# Patient Record
Sex: MALE | Race: OTHER | NOT HISPANIC OR LATINO
[De-identification: names, ages, dates, MRNs, and addresses within clinical notes are randomized per-mention and may not be internally consistent; named-entity substitution may affect disease eponyms.]

---

## 2020-04-22 PROBLEM — Z00.00 ENCOUNTER FOR PREVENTIVE HEALTH EXAMINATION: Status: ACTIVE | Noted: 2020-04-22

## 2021-11-30 ENCOUNTER — TRANSCRIPTION ENCOUNTER (OUTPATIENT)
Age: 51
End: 2021-11-30

## 2022-01-03 ENCOUNTER — APPOINTMENT (OUTPATIENT)
Dept: NEUROLOGY | Facility: CLINIC | Age: 52
End: 2022-01-03

## 2022-03-03 ENCOUNTER — APPOINTMENT (OUTPATIENT)
Dept: NEUROLOGY | Facility: CLINIC | Age: 52
End: 2022-03-03
Payer: COMMERCIAL

## 2022-03-03 VITALS
SYSTOLIC BLOOD PRESSURE: 133 MMHG | WEIGHT: 198 LBS | HEIGHT: 67 IN | BODY MASS INDEX: 31.08 KG/M2 | DIASTOLIC BLOOD PRESSURE: 90 MMHG | HEART RATE: 94 BPM

## 2022-03-03 DIAGNOSIS — G62.9 POLYNEUROPATHY, UNSPECIFIED: ICD-10-CM

## 2022-03-03 DIAGNOSIS — Z83.3 FAMILY HISTORY OF DIABETES MELLITUS: ICD-10-CM

## 2022-03-03 PROCEDURE — 99243 OFF/OP CNSLTJ NEW/EST LOW 30: CPT

## 2022-03-03 NOTE — HISTORY OF PRESENT ILLNESS
[FreeTextEntry1] : Mr. Jacobs is a 51-year-old   male MI current from [facility] was referred for neurological consultation by Dr. Waters.\par \par The patient is stated that he has developed burning paresthesias from the groin down to the medial thigh above the knee but affects only the medial part and similar burning paresthesias in both lower extremity distally below the midcalf which is started insidiously since approximately 1-1/2 years.  There is no back pain neurogenic claudication no focal motor weakness in the legs and review of systems failed to reveal any headache diplopia dysarthria dysphagia or dyspnea there is no upper extremity paresthesias focal motor weakness or incoordination is no diffuse muscle pain or arthralgias, dryness of mouth or eyes and is interrupted sleep is as result of burning paresthesias in the feet.  He denied any bowel bladder or erectile dysfunction girdle-like pain or ataxia.\par \par Past medical history is pertinent for reflux disease and has been on pantoprazole chronically for several years.  She denied any history of coronary artery disease pulmonary or GI issues there is no history of diabetes hypertension but has mild hypercholesterolemia without any premedication.  He denied any contact illnesses had COVID-19 but recovered uneventfully did not require any hospitalizations and this occurred in December 2021.  There are no sequela I of COVID-19 epidemic infection.  There is no deficiency disorders takes adequate food vitamins.\par \par There is a strong family history of type I and type 2 diabetes in several family members but he claims that he has been checked and there is no history of diabetes and denied any polydipsia polyuria erectile dysfunction and there is no history of dysautonomia.\par \par There is no history of tick bite venereal disease or other systemic disorders.  He has no toxic habits and works in [place] [employer] and his wife was a former patient of mine being treated by my partners for multiple sclerosis.  There is no family history of peripheral neuropathy known to him.  General examination is unremarkable

## 2022-03-03 NOTE — PHYSICAL EXAM
[FreeTextEntry1] : General examination is unremarkable.  Is a well-built  male with a height of 5 feet 7 inches and weight is 194 pounds.  Vital signs are unremarkable.  There is no carotid bruit thyromegaly or lymphadenopathy.  Chest is clear and heart sounds are normal.  Abdomen is soft no tenderness is noted and there is no organomegaly.  Pedal pulsations are normal and there is no leg edema and there are no trophic changes in the feet.  Straight leg raising test is negative bilaterally.  Cervical and lumbar spine range of motion is normal and there is no spine deformity or muscle spasm.  Muscles are not tender.\par \par Neurological examination is completely normal though he has a minor sensory in the medial thigh and feet but denied any dermatomal or distal pattern of significant degree.  There is no hyperesthesia hyperalgesia or allodynia no skin hair or nail changes.  Opinion– [General Appearance - Alert] : alert [General Appearance - In No Acute Distress] : in no acute distress [Oriented To Time, Place, And Person] : oriented to person, place, and time [Impaired Insight] : insight and judgment were intact [Affect] : the affect was normal [Person] : oriented to person [Place] : oriented to place [Time] : oriented to time [Concentration Intact] : normal concentrating ability [Visual Intact] : visual attention was ~T not ~L decreased [Naming Objects] : no difficulty naming common objects [Repeating Phrases] : no difficulty repeating a phrase [Writing A Sentence] : no difficulty writing a sentence [Fluency] : fluency intact [Comprehension] : comprehension intact [Reading] : reading intact [Past History] : adequate knowledge of personal past history [Cranial Nerves Optic (II)] : visual acuity intact bilaterally,  visual fields full to confrontation, pupils equal round and reactive to light [Cranial Nerves Oculomotor (III)] : extraocular motion intact [Cranial Nerves Trigeminal (V)] : facial sensation intact symmetrically [Cranial Nerves Facial (VII)] : face symmetrical [Cranial Nerves Vestibulocochlear (VIII)] : hearing was intact bilaterally [Cranial Nerves Glossopharyngeal (IX)] : tongue and palate midline [Cranial Nerves Accessory (XI - Cranial And Spinal)] : head turning and shoulder shrug symmetric [Cranial Nerves Hypoglossal (XII)] : there was no tongue deviation with protrusion [Motor Tone] : muscle tone was normal in all four extremities [Motor Strength] : muscle strength was normal in all four extremities [No Muscle Atrophy] : normal bulk in all four extremities [Sensation Tactile Decrease] : light touch was intact [Abnormal Walk] : normal gait [Balance] : balance was intact [Past-pointing] : there was no past-pointing [Tremor] : no tremor present [2+] : Ankle jerk left 2+ [Plantar Reflex Right Only] : normal on the right [Plantar Reflex Left Only] : normal on the left

## 2022-03-03 NOTE — DISCUSSION/SUMMARY
[FreeTextEntry1] : Opinion–the patient has insidious onset of approximately 1-1/2 years of burning paresthesias in the medial thigh and both distal lower extremities and did not exhibit any objective neurological abnormalities other than minimal sensory changes and his history is consistent with predominantly small fiber neuropathy and in view of strong family history of type: Type 2 diabetes I advised him to obtain hemoglobin A1c sedimentation rate CRP immunofixation serum vitamin B1 6 and 12 levels and he recently had general blood tests from his internist and he will secure the report and return back for electrophysiologic testing and if it does not reveal any medium or large fiber neuropathy I will undertake epidermal skin biopsy and if it confirms small fiber neuropathy I will undertake further blood tests for any possible etiology.\par \par Extensive education and counseling was undertaken today he understands and will proceed with my advice.

## 2022-03-03 NOTE — REVIEW OF SYSTEMS
[Numbness] : numbness [Abnormal Sensation] : an abnormal sensation [As Noted in HPI] : as noted in HPI [Negative] : Heme/Lymph

## 2022-07-19 ENCOUNTER — NON-APPOINTMENT (OUTPATIENT)
Age: 52
End: 2022-07-19

## 2022-08-30 ENCOUNTER — EMERGENCY (EMERGENCY)
Facility: HOSPITAL | Age: 52
LOS: 1 days | Discharge: HOME | End: 2022-08-30
Attending: EMERGENCY MEDICINE
Payer: COMMERCIAL

## 2022-08-30 VITALS
SYSTOLIC BLOOD PRESSURE: 146 MMHG | HEART RATE: 91 BPM | WEIGHT: 192.02 LBS | DIASTOLIC BLOOD PRESSURE: 90 MMHG | RESPIRATION RATE: 20 BRPM | TEMPERATURE: 98 F | OXYGEN SATURATION: 99 % | HEIGHT: 67 IN

## 2022-08-30 VITALS
TEMPERATURE: 99 F | SYSTOLIC BLOOD PRESSURE: 132 MMHG | DIASTOLIC BLOOD PRESSURE: 95 MMHG | RESPIRATION RATE: 18 BRPM | HEART RATE: 67 BPM | OXYGEN SATURATION: 99 %

## 2022-08-30 PROCEDURE — 99285 EMERGENCY DEPT VISIT HI MDM: CPT

## 2022-08-30 PROCEDURE — 70450 CT HEAD/BRAIN W/O DYE: CPT | Mod: 26,MA

## 2022-08-30 PROCEDURE — 76377 3D RENDER W/INTRP POSTPROCES: CPT | Mod: 26

## 2022-08-30 PROCEDURE — 76377 3D RENDER W/INTRP POSTPROCES: CPT

## 2022-08-30 PROCEDURE — 99284 EMERGENCY DEPT VISIT MOD MDM: CPT | Mod: 25

## 2022-08-30 PROCEDURE — 70486 CT MAXILLOFACIAL W/O DYE: CPT | Mod: MA

## 2022-08-30 PROCEDURE — 70450 CT HEAD/BRAIN W/O DYE: CPT | Mod: MA

## 2022-08-30 PROCEDURE — 70486 CT MAXILLOFACIAL W/O DYE: CPT | Mod: 26,MA

## 2022-08-30 NOTE — ED PROVIDER NOTE - CLINICAL SUMMARY MEDICAL DECISION MAKING FREE TEXT BOX
Robert Brewster (MD): Will obtain CT scan of head and maxillofacial region and if normal will treat pain and discharge.

## 2022-08-30 NOTE — ED ADULT NURSE NOTE - ED STAT RN HANDOFF DETAILS
Report given to receiving change of shift LOVE Mejia patient is in no acute distress. Patient vital signs stable, plan of care explained.

## 2022-08-30 NOTE — ED ADULT NURSE NOTE - NSSUHOSCREENINGYN_ED_ALL_ED
Please let wife know that Jean's blood came out good. Diabetes under good control. Cholesterol under good control. Electrolytes are good. He has plenty of vitamin B12 in his system and it looks like he takes daily vitamin B12.   I think he can just cut d Yes - the patient is able to be screened

## 2022-08-30 NOTE — ED PROVIDER NOTE - ENMT, MLM
Airway patent, Nasal mucosa clear. Mouth with normal mucosa. Throat has no vesicles, no oropharyngeal exudates and uvula is midline. States he has soreness in the upper part of his jaw on both sides.

## 2022-08-30 NOTE — ED ADULT TRIAGE NOTE - CHIEF COMPLAINT QUOTE
abrasion/swelling inside lip, abrasion to R temple s/p mechanical fall.  no LOC or blood thinner use

## 2022-08-30 NOTE — ED PROVIDER NOTE - NSFOLLOWUPINSTRUCTIONS_ED_ALL_ED_FT
rest, use motrin or tylenol for pain.   Follow up with dentist for fracture of tooth #12  Return for nausea and vomiting, increased pain, or new complaint.

## 2022-08-30 NOTE — ED ADULT NURSE NOTE - NSIMPLEMENTINTERV_GEN_ALL_ED
Implemented All Fall Risk Interventions:  [employer] to call system. Call bell, personal items and telephone within reach. Instruct patient to call for assistance. Room bathroom lighting operational. Non-slip footwear when patient is off stretcher. Physically safe environment: no spills, clutter or unnecessary equipment. Stretcher in lowest position, wheels locked, appropriate side rails in place. Provide visual cue, wrist band, yellow gown, etc. Monitor gait and stability. Monitor for mental status changes and reorient to person, place, and time. Review medications for side effects contributing to fall risk. Reinforce activity limits and safety measures with patient and family.

## 2022-08-30 NOTE — ED PROVIDER NOTE - SKIN, MLM
Skin normal color for race, warm, dry and intact. No evidence of rash. With minor abrasions and contusions to the left temporal area with some swelling. Some swelling on the right side of his cheek. Has 1cm laceration on the inside of his gums on the left with some lip swelling.

## 2022-08-30 NOTE — ED PROVIDER NOTE - CARE PLAN
1 Principal Discharge DX:	Contusion of head, initial encounter  Secondary Diagnosis:	Contusion of jaw  Secondary Diagnosis:	Fractured tooth

## 2022-08-30 NOTE — ED PROVIDER NOTE - PATIENT PORTAL LINK FT
You can access the FollowMyHealth Patient Portal offered by [facility] by registering at the following website: [email or web address]. By joining [facility]’s FollowMyHealth portal, you will also be able to view your health information using other applications (apps) compatible with our system.

## 2022-08-30 NOTE — ED PROVIDER NOTE - OBJECTIVE STATEMENT
51M with PMHx/PSHx including HLD, GERD, s/p cholecystectomy complaining of head and jaw pain s/p mechanical fall yesterday. Yesterday he was carrying some bags, got tangled up and fell over striking his head and face without being able to protect himself with his hands. He reports immediate swelling on left forehead and some bleeding from the lip. Since the fall the patient feels like his teeth do not fit together perfectly anymore. He has had no vomiting, and no focal neurologic changes. Denies being injured anywhere else. Patient has been able to function throughout the day, but the pain has continued and so he decided to report to the ED. NKDA. CAD

## 2022-08-30 NOTE — ED ADULT NURSE NOTE - OBJECTIVE STATEMENT
52yo M aaox4 h/o HLD, presents ambulatory to ED to home c/o hematoma, abrasion, as per pt yesterday got stuck on the luggage belt, then pt fell striking lawn his head, causing a hematoma on the L temporal area, , abrasion on the lower lip, denies LOC, no AC Pt denies CP, SOB, HA, vision changes, n/v/d, fevers chills, Safety and comfort measures initiated- bed placed in lowest position and side rails raised.

## 2022-09-13 ENCOUNTER — APPOINTMENT (OUTPATIENT)
Dept: NEUROLOGY | Facility: CLINIC | Age: 52
End: 2022-09-13